# Patient Record
Sex: FEMALE | URBAN - METROPOLITAN AREA
[De-identification: names, ages, dates, MRNs, and addresses within clinical notes are randomized per-mention and may not be internally consistent; named-entity substitution may affect disease eponyms.]

---

## 2024-10-08 ENCOUNTER — OFFICE VISIT (OUTPATIENT)
Dept: FAMILY MEDICINE CLINIC | Facility: CLINIC | Age: 39
End: 2024-10-08
Payer: COMMERCIAL

## 2024-10-08 VITALS
BODY MASS INDEX: 45.8 KG/M2 | OXYGEN SATURATION: 99 % | DIASTOLIC BLOOD PRESSURE: 72 MMHG | HEART RATE: 64 BPM | WEIGHT: 285 LBS | TEMPERATURE: 98 F | SYSTOLIC BLOOD PRESSURE: 108 MMHG | RESPIRATION RATE: 16 BRPM | HEIGHT: 66 IN

## 2024-10-08 DIAGNOSIS — J45.21 MILD INTERMITTENT ASTHMA WITH EXACERBATION (HCC): Primary | ICD-10-CM

## 2024-10-08 DIAGNOSIS — R05.9 COUGH, UNSPECIFIED TYPE: ICD-10-CM

## 2024-10-08 LAB
OPERATOR ID: NORMAL
RAPID SARS-COV-2 BY PCR: NOT DETECTED

## 2024-10-08 PROCEDURE — 99202 OFFICE O/P NEW SF 15 MIN: CPT | Performed by: NURSE PRACTITIONER

## 2024-10-08 PROCEDURE — U0002 COVID-19 LAB TEST NON-CDC: HCPCS | Performed by: NURSE PRACTITIONER

## 2024-10-08 RX ORDER — ALBUTEROL SULFATE 0.83 MG/ML
2.5 SOLUTION RESPIRATORY (INHALATION) EVERY 4 HOURS PRN
Qty: 1 EACH | Refills: 0 | Status: SHIPPED | OUTPATIENT
Start: 2024-10-08 | End: 2024-10-15

## 2024-10-08 RX ORDER — PREDNISONE 20 MG/1
20 TABLET ORAL 2 TIMES DAILY
Qty: 10 TABLET | Refills: 0 | Status: SHIPPED | OUTPATIENT
Start: 2024-10-08 | End: 2024-10-13

## 2024-10-09 NOTE — PATIENT INSTRUCTIONS
Go to ER for new or worsening symptoms  Use neb every 4-6 hours  Start antihistamine such as Xyzal

## 2024-10-09 NOTE — PROGRESS NOTES
Chief Complaint   Patient presents with    Cough     C/o cough x Thur, clearing of the throat, SOB, wheezing  Denies fever   Pt does have asthma  Breathing treatments every 3 hours, inhalers  No recent Covid test   OTC Mucinex, cold allergy      HPI:     This 39 year old female patient with known history of asthma presents with a chief complaint of cough, shortness of breath and chest tightness.   Onset gradual starting 5 days ago.    Symptoms have gradually worsened.    Associated symptoms include  bronchospasms .    The patient has had mild relief with their home medications including nebulizer treatments.    The patient has not been on any recent oral steroids.   Previous hospitalization for asthma/COPD no.  Smoker no.  Recent antibiotics no.   Prior history of pneumonia no.      Current Outpatient Medications   Medication Sig Dispense Refill    albuterol (2.5 MG/3ML) 0.083% Inhalation Nebu Soln Take 3 mL (2.5 mg total) by nebulization every 4 (four) hours as needed for Wheezing. 1 each 0    predniSONE 20 MG Oral Tab Take 1 tablet (20 mg total) by mouth 2 (two) times daily for 5 days. 10 tablet 0     No past medical history on file.    ROS:   ROS:  Constitutional: negative for chills, fatigue, and fevers  Eyes: negative  Ears, nose, mouth, throat, and face: negative  Respiratory: negative for stridor and wheezing  Cardiovascular: negative for chest pain and palpitations  Gastrointestinal: negative  Integument/breast: negative  Hematologic/lymphatic: negative  Musculoskeletal:negative    Physical Exam:   Physical Findings:  Blood pressure 108/72, pulse 64, temperature 98 °F (36.7 °C), resp. rate 16, height 5' 6\" (1.676 m), weight 285 lb (129.3 kg), last menstrual period 10/03/2024, SpO2 99%.  General: normal respirations in no acute distress, well hydrated, well nourished, and alert.  Head:  normocephalic and atraumatic.  Eyes:  conjuctiva clear.  Ears:  Tympanic membranes normal color.  Nose: Turbinates  normal.  Pharynx: normal without redness or exudates.  Neck: Cervical Lymphadenopathy -  no.  Heart: Regular rate and rhythm without S3,S4 or murmur.  Lungs: diminished breath sounds.  Abdomen: soft, non tender, normal bowel sounds and no organomegaly noted.  Skin:  warm and dry.  Extremities: Edema no Cyanosis no Clubbing no    Assessment/Plan:     Diagnosis:    ICD-10-CM    1. Mild intermittent asthma with exacerbation ([facility])  J45.21 albuterol (2.5 MG/3ML) 0.083% Inhalation Nebu Soln     predniSONE 20 MG Oral Tab      2. Cough, unspecified type  R05.9 Rapid Covid-19          Medications for this encounter:  Outpatient Encounter Medications as of 10/8/2024   Medication Sig Dispense Refill    albuterol (2.5 MG/3ML) 0.083% Inhalation Nebu Soln Take 3 mL (2.5 mg total) by nebulization every 4 (four) hours as needed for Wheezing. 1 each 0    predniSONE 20 MG Oral Tab Take 1 tablet (20 mg total) by mouth 2 (two) times daily for 5 days. 10 tablet 0     No facility-administered encounter medications on file as of 10/8/2024.       Orders Placed This Encounter    Rapid Covid-19     Order Specific Question:   Release to patient     Answer:   Immediate    albuterol (2.5 MG/3ML) 0.083% Inhalation Nebu Soln     Sig: Take 3 mL (2.5 mg total) by nebulization every 4 (four) hours as needed for Wheezing.     Dispense:  1 each     Refill:  0    predniSONE 20 MG Oral Tab     Sig: Take 1 tablet (20 mg total) by mouth 2 (two) times daily for 5 days.     Dispense:  10 tablet     Refill:  0       Labs performed this visit:  Recent Results (from the past 10 hour(s))   Rapid Covid-19    Collection Time: 10/08/24  6:52 PM    Specimen: Nares   Result Value Ref Range    Rapid SARS-CoV-2 by PCR Not Detected Not Detected    POCT Lot Number [id number]     POCT Expiration Date 3/24/25     POCT Procedure Control Control Valid Control Valid     ID [id number]        Plan:    Discharge medications: Prednisone 20 mg BID X 5 days  Nebulizer every 4-6  hours prn.  If needing more frequently go to ER.  Antihistamine for seasonal allergies.  Steam inhalation  Plenty of fluids  Rest     Return to clinic if not improving in 2 days for recheck   The patient indicates understanding of these issues and agrees to the plan.  The patient is asked to return in 2 days, sooner if worsening symptoms. ER if acute respiratory distress.     All results reviewed and discussed with patient.  See AVS for detailed discharge instructions for your condition today.    Follow Up with:  No follow-up provider specified.

## 2025-02-03 ENCOUNTER — APPOINTMENT (OUTPATIENT)
Dept: CT IMAGING | Age: 40
End: 2025-02-03
Attending: EMERGENCY MEDICINE
Payer: COMMERCIAL

## 2025-02-03 ENCOUNTER — APPOINTMENT (OUTPATIENT)
Dept: GENERAL RADIOLOGY | Age: 40
End: 2025-02-03
Attending: EMERGENCY MEDICINE
Payer: COMMERCIAL

## 2025-02-03 ENCOUNTER — HOSPITAL ENCOUNTER (EMERGENCY)
Age: 40
Discharge: HOME | End: 2025-02-03
Attending: EMERGENCY MEDICINE
Payer: COMMERCIAL

## 2025-02-03 VITALS
HEART RATE: 69 BPM | RESPIRATION RATE: 18 BRPM | TEMPERATURE: 98 F | OXYGEN SATURATION: 94 % | DIASTOLIC BLOOD PRESSURE: 61 MMHG | BODY MASS INDEX: 46.61 KG/M2 | WEIGHT: 290 LBS | SYSTOLIC BLOOD PRESSURE: 104 MMHG | HEIGHT: 66 IN

## 2025-02-03 DIAGNOSIS — R10.31 ABDOMINAL PAIN, RLQ: ICD-10-CM

## 2025-02-03 DIAGNOSIS — H83.09 VIRAL LABYRINTHITIS, UNSPECIFIED LATERALITY: ICD-10-CM

## 2025-02-03 DIAGNOSIS — K80.20 GALLSTONES: ICD-10-CM

## 2025-02-03 DIAGNOSIS — J06.9 VIRAL URI WITH COUGH: Primary | ICD-10-CM

## 2025-02-03 DIAGNOSIS — N30.00 ACUTE CYSTITIS WITHOUT HEMATURIA: ICD-10-CM

## 2025-02-03 LAB
ALBUMIN SERPL-MCNC: 4.4 G/DL (ref 3.2–4.8)
ALBUMIN/GLOB SERPL: 1.4 {RATIO} (ref 1–2)
ALP LIVER SERPL-CCNC: 64 U/L
ALT SERPL-CCNC: 14 U/L
ANION GAP SERPL CALC-SCNC: 7 MMOL/L (ref 0–18)
AST SERPL-CCNC: 11 U/L (ref ?–34)
ATRIAL RATE: 62 BPM
B-HCG UR QL: NEGATIVE
BASOPHILS # BLD AUTO: 0.02 X10(3) UL (ref 0–0.2)
BASOPHILS NFR BLD AUTO: 0.2 %
BILIRUB SERPL-MCNC: 0.8 MG/DL (ref 0.3–1.2)
BILIRUB UR QL CFM: NEGATIVE
BUN BLD-MCNC: 9 MG/DL (ref 9–23)
CALCIUM BLD-MCNC: 9.5 MG/DL (ref 8.7–10.6)
CHLORIDE SERPL-SCNC: 105 MMOL/L (ref 98–112)
CLARITY UR REFRACT.AUTO: CLEAR
CO2 SERPL-SCNC: 26 MMOL/L (ref 21–32)
COLOR UR AUTO: YELLOW
CREAT BLD-MCNC: 0.77 MG/DL
EGFRCR SERPLBLD CKD-EPI 2021: 99 ML/MIN/1.73M2 (ref 60–?)
EOSINOPHIL # BLD AUTO: 0.12 X10(3) UL (ref 0–0.7)
EOSINOPHIL NFR BLD AUTO: 1.2 %
ERYTHROCYTE [DISTWIDTH] IN BLOOD BY AUTOMATED COUNT: 13.2 %
GLOBULIN PLAS-MCNC: 3.1 G/DL (ref 2–3.5)
GLUCOSE BLD-MCNC: 104 MG/DL (ref 70–99)
GLUCOSE UR STRIP.AUTO-MCNC: NEGATIVE MG/DL
HCT VFR BLD AUTO: 41.3 %
HGB BLD-MCNC: 13.8 G/DL
IMM GRANULOCYTES # BLD AUTO: 0.04 X10(3) UL (ref 0–1)
IMM GRANULOCYTES NFR BLD: 0.4 %
LYMPHOCYTES # BLD AUTO: 1.93 X10(3) UL (ref 1–4)
LYMPHOCYTES NFR BLD AUTO: 19.4 %
MCH RBC QN AUTO: 27.9 PG (ref 26–34)
MCHC RBC AUTO-ENTMCNC: 33.4 G/DL (ref 31–37)
MCV RBC AUTO: 83.6 FL
MONOCYTES # BLD AUTO: 1.09 X10(3) UL (ref 0.1–1)
MONOCYTES NFR BLD AUTO: 11 %
NEUTROPHILS # BLD AUTO: 6.74 X10 (3) UL (ref 1.5–7.7)
NEUTROPHILS # BLD AUTO: 6.74 X10(3) UL (ref 1.5–7.7)
NEUTROPHILS NFR BLD AUTO: 67.8 %
NITRITE UR QL STRIP.AUTO: POSITIVE
OSMOLALITY SERPL CALC.SUM OF ELEC: 285 MOSM/KG (ref 275–295)
P AXIS: 72 DEGREES
P-R INTERVAL: 142 MS
PH UR STRIP.AUTO: 6 [PH] (ref 5–8)
PLATELET # BLD AUTO: 242 10(3)UL (ref 150–450)
POTASSIUM SERPL-SCNC: 3.4 MMOL/L (ref 3.5–5.1)
PROT SERPL-MCNC: 7.5 G/DL (ref 5.7–8.2)
Q-T INTERVAL: 414 MS
QRS DURATION: 84 MS
QTC CALCULATION (BEZET): 420 MS
R AXIS: 12 DEGREES
RBC # BLD AUTO: 4.94 X10(6)UL
SODIUM SERPL-SCNC: 138 MMOL/L (ref 136–145)
SP GR UR STRIP.AUTO: 1.02 (ref 1–1.03)
T AXIS: 40 DEGREES
UROBILINOGEN UR STRIP.AUTO-MCNC: >=8 MG/DL
VENTRICULAR RATE: 62 BPM
WBC # BLD AUTO: 9.9 X10(3) UL (ref 4–11)

## 2025-02-03 PROCEDURE — 74177 CT ABD & PELVIS W/CONTRAST: CPT | Performed by: EMERGENCY MEDICINE

## 2025-02-03 PROCEDURE — 81015 MICROSCOPIC EXAM OF URINE: CPT | Performed by: EMERGENCY MEDICINE

## 2025-02-03 PROCEDURE — 87086 URINE CULTURE/COLONY COUNT: CPT | Performed by: EMERGENCY MEDICINE

## 2025-02-03 PROCEDURE — 96360 HYDRATION IV INFUSION INIT: CPT

## 2025-02-03 PROCEDURE — 87077 CULTURE AEROBIC IDENTIFY: CPT | Performed by: EMERGENCY MEDICINE

## 2025-02-03 PROCEDURE — 80053 COMPREHEN METABOLIC PANEL: CPT | Performed by: EMERGENCY MEDICINE

## 2025-02-03 PROCEDURE — 81001 URINALYSIS AUTO W/SCOPE: CPT | Performed by: EMERGENCY MEDICINE

## 2025-02-03 PROCEDURE — 85025 COMPLETE CBC W/AUTO DIFF WBC: CPT | Performed by: EMERGENCY MEDICINE

## 2025-02-03 PROCEDURE — 93005 ELECTROCARDIOGRAM TRACING: CPT

## 2025-02-03 PROCEDURE — 99284 EMERGENCY DEPT VISIT MOD MDM: CPT

## 2025-02-03 PROCEDURE — 81025 URINE PREGNANCY TEST: CPT

## 2025-02-03 PROCEDURE — 87186 SC STD MICRODIL/AGAR DIL: CPT | Performed by: EMERGENCY MEDICINE

## 2025-02-03 PROCEDURE — 71045 X-RAY EXAM CHEST 1 VIEW: CPT | Performed by: EMERGENCY MEDICINE

## 2025-02-03 PROCEDURE — 96361 HYDRATE IV INFUSION ADD-ON: CPT

## 2025-02-03 PROCEDURE — 93010 ELECTROCARDIOGRAM REPORT: CPT

## 2025-02-03 PROCEDURE — 99285 EMERGENCY DEPT VISIT HI MDM: CPT

## 2025-02-03 RX ORDER — POTASSIUM CHLORIDE 1500 MG/1
40 TABLET, EXTENDED RELEASE ORAL ONCE
Status: DISCONTINUED | OUTPATIENT
Start: 2025-02-03 | End: 2025-02-03

## 2025-02-03 RX ORDER — MECLIZINE HYDROCHLORIDE 25 MG/1
25 TABLET ORAL ONCE
Status: COMPLETED | OUTPATIENT
Start: 2025-02-03 | End: 2025-02-03

## 2025-02-03 RX ORDER — NITROFURANTOIN 25; 75 MG/1; MG/1
100 CAPSULE ORAL 2 TIMES DAILY
Qty: 14 CAPSULE | Refills: 0 | Status: SHIPPED | OUTPATIENT
Start: 2025-02-03 | End: 2025-02-10

## 2025-02-03 RX ORDER — MECLIZINE HYDROCHLORIDE 25 MG/1
25 TABLET ORAL 3 TIMES DAILY PRN
Qty: 15 TABLET | Refills: 0 | Status: SHIPPED | OUTPATIENT
Start: 2025-02-03

## 2025-02-03 NOTE — DISCHARGE INSTRUCTIONS
Provide infection, Macrobid 1 tablet twice a day for 7 days.  Stay well-hydrated.  You can take 1 Antivert every 6 hours as needed for dizziness.  Return if worsening dizziness, new complaints.  Follow-up with primary care.  Stay well-hydrated.

## 2025-02-03 NOTE — ED PROVIDER NOTES
Patient Seen in: [facility] In [place]      History     Chief Complaint   Patient presents with    Cough     d    Dizziness     Stated Complaint: cough w phlegm x1 week and  dizziness 2 days    Subjective:   HPI      Patient is a 39-year-old female who presents the emergency room with her daughter.  Both her and her daughter been sick over the last week to 10 days.  Daughter tested positive for the flu.  Patient herself says she initially felt like she had the flu.  Was taken over-the-counter flu medicines and felt better.  She however is continue to cough.  She has right lower quadrant pain.  She thinks she may have pulled something coughing though she is lost her appetite as well.  She feels like the room is spinning at times.  She is congested.  No focal numbness or weakness.  Does feel tingling in both hands.  Brought her and her daughter in for reevaluation.  No other specific complaints.    Objective:     History reviewed. No pertinent past medical history.           History reviewed. No pertinent surgical history.             Social History     Socioeconomic History    Marital status:    Tobacco Use    Smoking status: Never    Smokeless tobacco: Never   Substance and Sexual Activity    Alcohol use: Never    Drug use: Never     Social Drivers of Health      Received from [facility]    Social Connections    Received from [facility]    Housing Stability                  Physical Exam     ED Triage Vitals [02/03/25 1134]   /75   Pulse 68   Resp 16   Temp 97 °F (36.1 °C)   Temp src Temporal   SpO2 98 %   O2 Device None (Room air)       Current Vitals:   Vital Signs  BP: 111/74  Pulse: 56  Resp: 16  Temp: 97 °F (36.1 °C)  Temp src: Temporal    Oxygen Therapy  SpO2: 100 %  O2 Device: None (Room air)        Physical Exam  General: Patient is resting comfortably in no acute distress  HEENT: Normal cephalic atraumatic.  Nonicteric sclera.  Moist  mucous membranes.  No meningismus.  No adenopathy fluid behind both TMs..  Extract muscles intact.  Nonfocal.  Normal speech.  Good strength throughout.  Normal gait.  Lungs: No tachypnea.  Lungs clear to auscultation bilaterally without rales/rhonchi.  Equal breath sounds bilaterally  Cardiac: No tachycardia.  No murmurs.  Regular rate and rhythm.  Abdomen: Tenderness in the right lower quadrant with guarding.  No rebound.  Extremities: No clubbing/cyanosis/edema.  Skin: No rashes, no pallor  Neuro: Awake oriented ×3.  Nonfocal.  Good strength throughout    ED Course     Labs Reviewed   COMP METABOLIC PANEL (14) - Abnormal; Notable for the following components:       Result Value    Glucose 104 (*)     Potassium 3.4 (*)     All other components within normal limits   CBC WITH DIFFERENTIAL WITH PLATELET - Abnormal; Notable for the following components:    Monocyte Absolute 1.09 (*)     All other components within normal limits   URINALYSIS WITH CULTURE REFLEX - Abnormal; Notable for the following components:    Ketones Urine Trace (*)     Blood Urine Trace-Intact (*)     Protein Urine 30 mg/dL (*)     Urobilinogen Urine >=8.0 (*)     Nitrite Urine Positive (*)     Leukocyte Esterase Urine Moderate (*)     All other components within normal limits   UA MICROSCOPIC ONLY, URINE - Abnormal; Notable for the following components:    WBC Urine 21-50 (*)     Bacteria Urine 3+ (*)     Squamous Epi. Cells Few (*)     All other components within normal limits   ICTOTEST - Normal   POCT PREGNANCY URINE - Normal   URINE CULTURE, ROUTINE     EKG    Rate, intervals and axes as noted on EKG Report.  Rate: 62  Rhythm: Sinus Rhythm  Reading: Normal sinus rhythm.  Nonspecific ST-T wave changes.  Axis/intervals are noted but otherwise, agree with EKG report                CT appendix: No acute appendicitis.  Bladder wall thickening.  Cholelithiasis.  Chest x-ray: I personally reviewed the films and my independent interpertaion showed  no pneumonia or pneumothorax..  Official report reviewed, negative   UA suggestive of infection with 21-50 whites.  +3 bacteria, few squamous cells.  Was sent for culture.  Pregnancy test is negative.  Potassium 3.4.  Offered potassium patient declined.  White count 9.9.  MDM      Patient is a 39-year-old multiple complaints.  Symptoms started as viral URI with a daughter who tested positive for the flu.  She continues to be congested with some coughing.  She has right lower quadrant pain.  She is dizzy.  Describes vertigo.  She has tingling in both arms.  There is no focal numbness or weakness.  No other specific complaints.  Discussed with patient, suspect viral labyrinthitis given her symptoms.  Nothing focal on exam.  She also has right lower quadrant pain.  Will hydrate.  Will check blood work.  Will check CT of the abdomen pelvis to rule out appendicitis.  Will check chest x-ray.  Will reassess after fluids and treatment.  Will give Antivert here.    Blood work, CT reviewed.  No acute appendicitis.  Likely musculoskeletal from the coughing.  Possible UTI.  Patient is nonfocal here.  Likely mild labyrinthitis.  Will treat with Antivert.  Will treat UTI with Macrobid.  Will send urine for culture.  Refer primary care follow-up.  Patient ambulatory here and well-appearing.  Return if worsening symptoms, new complaints.  Follow-up with primary care    Medical Decision Making      Disposition and Plan     Clinical Impression:  1. Viral URI with cough    2. Viral labyrinthitis, unspecified laterality    3. Acute cystitis without hematuria    4. Abdominal pain, RLQ         Disposition:  Discharge  2/3/2025  2:55 pm    Follow-up:  Leonie Hodges MD  [address] [address] [phone]    Follow up in 1 week(s)            Medications Prescribed:  Current Discharge Medication List        START taking these medications    Details   nitrofurantoin monohydrate macro 100 MG Oral Cap Take 1 capsule  (100 mg total) by mouth 2 (two) times daily for 7 days.  Qty: 14 capsule, Refills: 0      meclizine 25 MG Oral Tab Take 1 tablet (25 mg total) by mouth 3 (three) times daily as needed.  Qty: 15 tablet, Refills: 0                 Supplementary Documentation:

## 2025-02-03 NOTE — ED INITIAL ASSESSMENT (HPI)
Cough for one week, daughter had influenza, right lower abdom hurts from coughing, room spinning, right arm gets numbness/tingling since Friday, happens a few times/day

## 2025-02-07 ENCOUNTER — TELEPHONE (OUTPATIENT)
Dept: INTERNAL MEDICINE CLINIC | Facility: CLINIC | Age: 40
End: 2025-02-07

## 2025-02-14 NOTE — TELEPHONE ENCOUNTER
Left message asking if patient wants to establish care at [facility] following an emergency room visit.  Multiple attempts made to contact patient.  Letter mailed.

## (undated) NOTE — LETTER
February 14, 2025        Susana Gonzalez  [address]  [place] [address]      Dear Susana:    We have attempted to contact you by phone with no success. In an effort to provide quality patient care we are reaching out to you to contact the office at your earliest convenience to schedule an emergency room follow up appointment.    If you have established care with a different provider, please call our office so we can update your file to list the correct provider name and information. Once we update your file with this information it will eliminate further phone calls and/or correspondence from our office.Thank you for your cooperation.      If you have recently contacted us, please disregard this letter.      You may call the office at [phone] our phones are on service Monday-Thursday 8am-4:45 pm and Friday 8am-3 pm.      Thank you,      The office of Rodrigue Hodges and  France Harry